# Patient Record
Sex: MALE | Race: OTHER | URBAN - METROPOLITAN AREA
[De-identification: names, ages, dates, MRNs, and addresses within clinical notes are randomized per-mention and may not be internally consistent; named-entity substitution may affect disease eponyms.]

---

## 2024-02-12 ENCOUNTER — OFFICE VISIT (OUTPATIENT)
Dept: FAMILY MEDICINE CLINIC | Facility: CLINIC | Age: 39
End: 2024-02-12

## 2024-02-12 VITALS
BODY MASS INDEX: 38.92 KG/M2 | SYSTOLIC BLOOD PRESSURE: 132 MMHG | DIASTOLIC BLOOD PRESSURE: 86 MMHG | WEIGHT: 248 LBS | HEART RATE: 89 BPM | HEIGHT: 67 IN

## 2024-02-12 DIAGNOSIS — Z00.00 PHYSICAL EXAM: Primary | ICD-10-CM

## 2024-02-12 PROCEDURE — 3008F BODY MASS INDEX DOCD: CPT | Performed by: FAMILY MEDICINE

## 2024-02-12 PROCEDURE — 3075F SYST BP GE 130 - 139MM HG: CPT | Performed by: FAMILY MEDICINE

## 2024-02-12 PROCEDURE — 99385 PREV VISIT NEW AGE 18-39: CPT | Performed by: FAMILY MEDICINE

## 2024-02-12 PROCEDURE — 3079F DIAST BP 80-89 MM HG: CPT | Performed by: FAMILY MEDICINE

## 2024-02-12 NOTE — PROGRESS NOTES
2/12/2024  2:22 PM    Patricio Buck Jr. is a 38 year old male.    Chief complaint(s):   Chief Complaint   Patient presents with    Physical     Allergy test     Dizziness     Episodes of dizziness X1 week      HPI:     Patricio Buck Jr. primary complaint is regarding CPE.     Patricio Buck Jr. is a 38 year old male is here for routine periodic health screening and examination.  His last physical exam was 8 years ago.  His last ECG was none . His last diabetes screening test was none .   His last cholesterol test was none.  Last dentist visit was 12months ago.  He is current with his Td immunization.  He is not current with his Flu vaccination and is not interested in receiving it today.  Patient last colonoscopy was none. He is not a smoker.        HISTORY:  No past medical history on file.   No past surgical history on file.   Family History   Problem Relation Age of Onset    Lipids Mother     Hypertension Mother     Hypertension Sister       Social History:   Social History     Socioeconomic History    Marital status: Single   Tobacco Use    Smoking status: Never   Substance and Sexual Activity    Alcohol use: Yes     Comment: occ    Drug use: No        Immunizations:     There is no immunization history on file for this patient.    Medications (Active prior to today's visit):  No current outpatient medications on file.       Allergies:  No Known Allergies      ROS:   Review of Systems   Constitutional:  Negative for appetite change, fatigue and fever.   HENT:  Negative for hearing loss and nosebleeds.    Eyes:  Negative for pain and visual disturbance.   Respiratory:  Negative for apnea and shortness of breath.    Cardiovascular:  Negative for chest pain, palpitations and leg swelling.   Gastrointestinal:  Negative for abdominal pain, blood in stool, constipation, diarrhea, nausea and vomiting.   Endocrine: Negative for polydipsia and polyuria.   Genitourinary:  Negative for decreased urine volume, frequency  and hematuria.        No nocturia   Musculoskeletal:  Negative for arthralgias.   Skin:  Negative for rash.   Neurological:  Positive for dizziness. Negative for syncope and headaches.   Psychiatric/Behavioral:  Negative for dysphoric mood and sleep disturbance.        PHYSICAL EXAM:   VS: /86   Pulse 89   Ht 5' 7\" (1.702 m)   Wt 248 lb (112.5 kg)   BMI 38.84 kg/m²     Physical Exam  Vitals reviewed.   Constitutional:       Appearance: Normal appearance.      Comments:      HENT:      Head: Normocephalic.      Right Ear: Hearing, tympanic membrane and ear canal normal.      Left Ear: Hearing, tympanic membrane and ear canal normal.      Nose: Nose normal. No rhinorrhea.      Mouth/Throat:      Mouth: Mucous membranes are moist.      Pharynx: Oropharynx is clear.   Eyes:      Extraocular Movements: Extraocular movements intact.      Conjunctiva/sclera: Conjunctivae normal.      Pupils: Pupils are equal, round, and reactive to light.   Neck:      Thyroid: No thyroid mass.      Vascular: No carotid bruit.   Cardiovascular:      Rate and Rhythm: Normal rate and regular rhythm.      Heart sounds: Normal heart sounds, S1 normal and S2 normal. No murmur heard.  Pulmonary:      Effort: Pulmonary effort is normal.      Breath sounds: Normal breath sounds.   Abdominal:      General: Abdomen is flat. Bowel sounds are normal.      Palpations: Abdomen is soft. There is no hepatomegaly, splenomegaly or mass.      Tenderness: There is no abdominal tenderness.      Hernia: No hernia is present.   Musculoskeletal:      Cervical back: Neck supple.      Comments: Spinal exam without scoliosis.      Lymphadenopathy:      Cervical: No cervical adenopathy.   Skin:     General: Skin is warm.      Findings: No rash.   Neurological:      General: No focal deficit present.      Mental Status: He is alert.      Deep Tendon Reflexes:      Reflex Scores:       Patellar reflexes are 2+ on the right side and 2+ on the left  side.  Psychiatric:         Attention and Perception: Attention normal.         Mood and Affect: Mood and affect normal.         LABORATORY RESULTS:     EKG / Spirometry : -     Radiology: No results found.     ASSESSMENT/PLAN:   Assessment   Encounter Diagnosis   Name Primary?    Physical exam Yes         CPE PLAN:    LABS / TEST & ORDERS for today's visit :Blood test(s) ordered today for send out to [facility] lab:  Orders Placed This Encounter   Procedures    CBC With Differential With Platelet    Comp Metabolic Panel (14)    Hemoglobin A1C    Lipid Panel    TSH W Reflex To Free T4    Vitamin D    Urinalysis with Culture Reflex      Referrals: EKG 12-LEAD  In-House; Urine dip.  Test/Procedures done today include: EKG.    IMMUNIZATIONS: none given today.    RECOMMENDATIONS given include: ANTICIPATORY GUIDANCE  topics covered today include: safety (i.e. seat belts, helmets, sunscreen, protective sports gear ), nutrition (i.e. healthy meals and snacks (i.e. avoid junk food and high-carbohydrate foods); athletic conditioning, fluids; low fat milk, limit to less than 20 oz. a day; dental care ), and Healthy habits& Social competence & Responsibilities: Recommendations on physical activity; exercise daily or at least 3 times a week for 30-60 minutes doing cardiovascular exercise. Encourage to maintain the best physical and dental hygiene possible.  Consider a  if overweight and/or having difficult in staying active; attempt to keep a schedule that includes an adequate sleep and physical exercise / activities Patient educated on doing regular self testicular exam. Patient was educated on sexual transmitted disease. Best to abstain from sexual intercourse until he is ready to form a family. Use of condoms may prevent transmission of infections as well as pregnancy.    FOLLOW-UP: Schedule a follow-up visit in 12 months.          Orders This Visit:  Orders Placed This Encounter   Procedures    CBC  With Differential With Platelet    Comp Metabolic Panel (14)    Hemoglobin A1C    Lipid Panel    TSH W Reflex To Free T4    Vitamin D    Urinalysis with Culture Reflex       Meds This Visit:  Requested Prescriptions      No prescriptions requested or ordered in this encounter       Imaging & Referrals:  EKG 12-LEAD         KYLEE ALBERT MD

## 2024-06-24 ENCOUNTER — OFFICE VISIT (OUTPATIENT)
Dept: INTERNAL MEDICINE CLINIC | Facility: CLINIC | Age: 39
End: 2024-06-24

## 2024-06-24 VITALS
DIASTOLIC BLOOD PRESSURE: 84 MMHG | SYSTOLIC BLOOD PRESSURE: 136 MMHG | BODY MASS INDEX: 39.08 KG/M2 | WEIGHT: 249 LBS | HEIGHT: 67 IN | HEART RATE: 74 BPM | TEMPERATURE: 98 F | OXYGEN SATURATION: 98 %

## 2024-06-24 DIAGNOSIS — R07.9 CHEST PAIN, UNSPECIFIED TYPE: ICD-10-CM

## 2024-06-24 DIAGNOSIS — R21 RASH: ICD-10-CM

## 2024-06-24 DIAGNOSIS — Z00.00 HEALTH MAINTENANCE EXAMINATION: Primary | ICD-10-CM

## 2024-06-24 DIAGNOSIS — E66.09 CLASS 2 OBESITY DUE TO EXCESS CALORIES WITHOUT SERIOUS COMORBIDITY WITH BODY MASS INDEX (BMI) OF 39.0 TO 39.9 IN ADULT: ICD-10-CM

## 2024-06-24 PROBLEM — E66.812 CLASS 2 OBESITY DUE TO EXCESS CALORIES WITHOUT SERIOUS COMORBIDITY WITH BODY MASS INDEX (BMI) OF 39.0 TO 39.9 IN ADULT: Status: ACTIVE | Noted: 2024-06-24

## 2024-06-24 NOTE — ASSESSMENT & PLAN NOTE
Patient with a history of pink rash seen in photos  No rash at this time  Consider urticaria.  Unclear if any triggers.   Was taking vitamin D3 -clear if related any weight  He also does have a history of persistent sun exposure-also unclear if related  Can see dermatology for further evaluation

## 2024-06-24 NOTE — PROGRESS NOTES
FAMILY MEDICINE CLINIC NOTE    HPI  Patricio Buck Jr. is a 39 year old male presenting to establish [facility]    #Health Maintenance  -Diet: Has been eating healthier  -Exercise: Cardio - boxing and weight lifting. 3 days a week  -Lung cancer screen: Not indicated  -Colon cancer screen: Not indicated  -Prostate cancer screen: Not indicated  -Aortic aneurysm screen: Not indicated  -Statin:  Will check lipid panel  -ASA: Not indicated  -HIV screen: Indicated  -Hep C screen: Indicated  -Gonorrhea/chlamydia:  Not indicated  -Syphillis: Not indicated  -TB: Not indicated  -Tobacco/alcohol: Per below  -Depression: PHQ-2 score of 0 (score >/= 3 do PHQ-9)  -Advanced Directive: Indicated    #Immunizations  -Tdap: Indicated  -Flu shot: Not indicated  -PCV13: Not indicated   -PCV20: Not indicated   -PPSV23: Not indicated   -HPV: Not indicated  -RZV (preferred) or VZL: Not indicated   -RSV: Not indicated   -COVID: Indicated      #Rash  -history of rash by arms and hands  -resolved currently  -was taking D3 - unsure if related  -was in the sun all day   -pink rash-not itchy   -has been on steroids in the past  -stopped D3 and now resolved   -has photos on phone    #chest wall  -left sided  -occurred 2-3 days ago  -no pain currently  -wasn't sure if he lifted something too heavy   -mild uncomfortable feeling   -some of the pain is positional - when he lies a certain way can make pain worse  -pain lasts for a couple seconds   -no SOB    #Patient Care Team  Patient Care Team:  Kacy Mayers MD as PCP - General    ROS  GENERAL: No fever/chills, no recent weight loss   HEENT: No visual changes, no changes in hearing, no sore throats  NECK: No pain, no swelling  RESP: No cough, no SOB  CV: No chest pain, no palpitations  GI: No abd pain, no N/V/D  MSK: No edema, + pain  SKIN: No new rashes  NEURO: No numbness, no tingling, no HA    HEALTH MAINTENANCE CHECKLIST  Health Maintenance Topics with due status: Overdue       Topic Date Due     DTaP,Tdap,and Td Vaccines Never done    COVID-19 Vaccine Never done       ALLERGIES  No Known Allergies    MEDICATIONS  No current outpatient medications on file.       ACTIVE PROBLEM  Patient Active Problem List   Diagnosis    Health maintenance examination    Class 2 obesity due to excess calories without serious comorbidity with body mass index (BMI) of 39.0 to 39.9 in adult    Rash    Chest pain       PAST MEDICAL HISTORY  History reviewed. No pertinent past medical history.    PAST SOCIAL HISTORY  Social History     Socioeconomic History    Marital status: Single     Spouse name: Not on file    Number of children: Not on file    Years of education: Not on file    Highest education level: Not on file   Occupational History    Not on file   Tobacco Use    Smoking status: Never    Smokeless tobacco: Never   Vaping Use    Vaping status: Never Used   Substance and Sexual Activity    Alcohol use: Yes     Comment: Occasional - once a week    Drug use: No    Sexual activity: Yes     Partners: Female   Other Topics Concern    Not on file   Social History Narrative    Relationships:  - Yesenia     Children: Jeni (3F). Michelle - Liam (19M), Mago (18F), Jadashawnrick (15F)    Pets: None    School: N/A    Work: [employer] - transferred to the [employer]     Origin: From [place]. Father was born here. Mother from [place].     Interests: Spending time with family. Spending time working.     Spiritual: Episcopalian - prays in the morning, goes to [employer].      Social Determinants of Health     Financial Resource Strain: Not on file   Food Insecurity: Not on file   Transportation Needs: Not on file   Physical Activity: Not on file   Stress: Not on file   Social Connections: Not on file   Housing Stability: Not on file       PAST SURGICAL HISTORY  History reviewed. No pertinent surgical history.    PAST FAMILY HISTORY  Family History   Problem Relation Age of Onset    Hyperlipidemia Mother     Hypertension Mother      No Known Problems Father         Not in contact    Hypertension Sister     Breast Cancer Maternal Grandmother     Cancer Maternal Grandfather         Brain    No Known Problems Paternal Grandmother     No Known Problems Paternal Grandfather     Colon Cancer Neg     Prostate Cancer Neg        PHYSICAL EXAM  Vitals:    06/24/24 1536   BP: 136/84   Pulse: 74   Temp: 98.4 °F (36.9 °C)   SpO2: 98%   Weight: 249 lb (112.9 kg)   Height: 5' 7\" (1.702 m)      Body mass index is 39 kg/m².    GENERAL: NAD  HEENT: Moist mucous membranes, no tonsillar swelling or erythema, PERRLA bilat, TM translucent and non-bulging  NECK: Supple, non-tender  RESP: CTAB, no wheezing, no rales, no ronchi  CV: RRR, no murmurs  GI: Soft, non-distended, non-tender, no guarding, no rebound, no masses  MSK: No edema.  No chest wall tenderness palpation.  SKIN: Warm and dry, no rashes seen on the hands at this time.  NEURO: Answering questions appropriately    LABS  No results found for: \"WBC\", \"HGB\", \"HCT\", \"PLT\", \"NEPERCENT\", \"LYPERCENT\", \"MOPERCENT\", \"EOPERCENT\", \"BAPERCENT\", \"NE\", \"LYMABS\", \"MOABSO\", \"EOABSO\", \"BAABSO\", \"REITCPERCENT\"    Lab Results   Component Value Date    GLU 90 09/22/2016         Lab Results   Component Value Date    CHOLEST 193 09/22/2016    TRIG 109 09/22/2016    HDL 39 09/22/2016     (H) 09/22/2016    CALCNONHDL 154 (H) 09/22/2016        DIAGNOSTICS    ASSESSMENT/PLAN  Problem List Items Addressed This Visit          Cardiac and Vasculature    Chest pain     Patient with a recent episode of mild left-sided chest discomfort, now currently resolved.  He works as [employer] and does regular heavy lifting-he is unsure if this was related to that.  He does note that the pain was short lasting and postural-sleeping on it made it worse.  Potentially musculoskeletal related-can take Advil as needed.  ER precautions discussed  Follow-up as needed.            Endocrine and Metabolic    Class 2 obesity due to excess calories  without serious comorbidity with body mass index (BMI) of 39.0 to 39.9 in adult     Healthy diet and exercise advised  Discussed options for weight loss-consider jumpstart program-deferred for now.  He is interested in seeing a bariatric specialist-referral provided  Monitor portion sizes  Start exercising more.         Relevant Orders    Comp Metabolic Panel (14)    Hemoglobin A1C    Lipid Panel    TSH W Reflex To Free T4    [facility] - Dr. Santos Chamberlain [facility] EMMG 9       Skin    Rash     Patient with a history of pink rash seen in photos  No rash at this time  Consider urticaria.  Unclear if any triggers.   Was taking vitamin D3 -clear if related any weight  He also does have a history of persistent sun exposure-also unclear if related  Can see dermatology for further evaluation         Relevant Orders    DERM - INTERNAL       Health Encounters    Health maintenance examination - Primary     Exercise and diet advised.  CBC, CMP, lipid panel, Hba1c, TSH, HIV screen, hepatitis C screen  Tdap today.  Advanced directive information provided.  Advised COVID vaccine.         Relevant Orders    CBC With Differential With Platelet    Comp Metabolic Panel (14)    HCV Antibody    Hemoglobin A1C    HIV Ag/Ab Combo    Lipid Panel    TSH W Reflex To Free T4    TETANUS, DIPHTHERIA TOXOIDS AND ACELLULAR PERTUSIS VACCINE (TDAP), >7 YEARS, IM USE (Completed)       Return in about 1 year (around 6/24/2025) for physical .    Len Borrego MD  Family Medicine

## 2024-06-24 NOTE — ASSESSMENT & PLAN NOTE
Healthy diet and exercise advised  Discussed options for weight loss-consider jumpstart program-deferred for now.  He is interested in seeing a bariatric specialist-referral provided  Monitor portion sizes  Start exercising more.

## 2024-06-24 NOTE — PATIENT INSTRUCTIONS
PATIENT INSTRUCTIONS    Thank you for seeing me today, it was a pleasure taking care of you.  Please check out at the  and schedule a follow up appointment.  Return in about 1 year (around 6/24/2025) for physical .  Please remember that the candi period for all appointments is 5 minutes. This is to help maximize the amount of time that we can spend together at our visits.    Please get your labs drawn - you may need to schedule a lab appointment if this was not completed at your recent doctor's visit.  The following imaging studies were ordered: None  Please also follow up with the following specialists: Weight, dermatology   Please fill out the advance directive information (power of  documents) and bring a copy to our clinic.  Portion control - monitor how much you are eating and try to cut back.  Do what you can to make healthy choices (as an example, choose water over soda).  You can try apps such a MyFitnessPal or Weight Watchers to help you monitor your diet and exercise habits.  Exercise - increase your intensity level slowly if you do not exercise regularly. Ideally your goal should eventually be to be moving at a pace where it is hard to have a conversation with someone.  Bring in photos of rash to dermatologist  Monitor sun exposure - see if related   Sun screen  If severe chest pain, go to ER  If discomfort from lifting, advil as needed      Best,   Dr. Elmo ROSENSheridan LABS AND IMAGING INFORMATION    Here are some lab and imaging locations available to you. Please note that some of the times and availabilities are subject to change. Please refer to the  [email or web address] webpage for the most recent updates. There are also additional locations that can be found on the  [email or web address] webpage.    To schedule a lab appointment, please call [phone].    To schedule an imaging appointment, please call [phone], option 2      [facility] [place]  [address]  [place] [address]    Lab Hours  Monday - Friday 7:30am - 5pm  Saturday 7:30am - 4pm    X-ray Hours  Monday - Friday 8am - 8pm  Sat, Sun & holidays 8am - 4:30pm      [facility]  [address] [place] [address]    Lab Hours  Monday - Friday 6:30am - 8pm  Saturday 6:30am - 3pm  Sunday 7:30am - 4pm    X-ray Hours  Monday - Friday 7am - 8pm  Saturday 7am - 3:30pm      [facility]  [address] [place] [address]    Lab Hours  Monday - Friday 7:30am - 5pm    X-ray Hours  None at this location      [facility] [address] [address]    Lab Hours  Lab services temporarily unavailable    X-ray Hours  Monday - Friday 8am - 4:30pm      [facility] [place]  [address] [place] [address]    Lab Hours  Monday - Friday 7:30am - 5pm  Saturday 7:30am - 4pm    X-ray Hours  Monday - Friday 8am - 8pm  Sat, Sun & holidays 8am - 4pm      [facility] [place]  [address]  [place] [address]    Lab Hours  Monday - Friday 7:30am - 4pm    X-ray Hours  Monday - Friday 8am - 4:30pm      [facility] [place]  [address]  [address]  [place] [address]    Lab Hours  Monday - Friday 8am - 4:30pm    X-ray Hours  None at this location

## 2024-06-24 NOTE — ASSESSMENT & PLAN NOTE
Patient with a recent episode of mild left-sided chest discomfort, now currently resolved.  He works as [employer] and does regular heavy lifting-he is unsure if this was related to that.  He does note that the pain was short lasting and postural-sleeping on it made it worse.  Potentially musculoskeletal related-can take Advil as needed.  ER precautions discussed  Follow-up as needed.

## 2024-06-25 ENCOUNTER — LAB ENCOUNTER (OUTPATIENT)
Dept: LAB | Age: 39
End: 2024-06-25
Attending: FAMILY MEDICINE

## 2024-06-25 DIAGNOSIS — Z00.00 HEALTH MAINTENANCE EXAMINATION: ICD-10-CM

## 2024-06-25 DIAGNOSIS — E66.09 CLASS 2 OBESITY DUE TO EXCESS CALORIES WITHOUT SERIOUS COMORBIDITY WITH BODY MASS INDEX (BMI) OF 39.0 TO 39.9 IN ADULT: ICD-10-CM

## 2024-06-25 LAB
ALBUMIN SERPL-MCNC: 4.3 G/DL (ref 3.2–4.8)
ALBUMIN/GLOB SERPL: 1.5 {RATIO} (ref 1–2)
ALP LIVER SERPL-CCNC: 93 U/L
ALT SERPL-CCNC: 40 U/L
ANION GAP SERPL CALC-SCNC: 8 MMOL/L (ref 0–18)
AST SERPL-CCNC: 26 U/L (ref ?–34)
BASOPHILS # BLD AUTO: 0.02 X10(3) UL (ref 0–0.2)
BASOPHILS NFR BLD AUTO: 0.2 %
BILIRUB SERPL-MCNC: 0.6 MG/DL (ref 0.3–1.2)
BUN BLD-MCNC: 12 MG/DL (ref 9–23)
BUN/CREAT SERPL: 14 (ref 10–20)
CALCIUM BLD-MCNC: 9.3 MG/DL (ref 8.7–10.4)
CHLORIDE SERPL-SCNC: 107 MMOL/L (ref 98–112)
CHOLEST SERPL-MCNC: 184 MG/DL (ref ?–200)
CO2 SERPL-SCNC: 22 MMOL/L (ref 21–32)
CREAT BLD-MCNC: 0.86 MG/DL
DEPRECATED RDW RBC AUTO: 41.5 FL (ref 35.1–46.3)
EGFRCR SERPLBLD CKD-EPI 2021: 113 ML/MIN/1.73M2 (ref 60–?)
EOSINOPHIL # BLD AUTO: 0.24 X10(3) UL (ref 0–0.7)
EOSINOPHIL NFR BLD AUTO: 2.7 %
ERYTHROCYTE [DISTWIDTH] IN BLOOD BY AUTOMATED COUNT: 12.6 % (ref 11–15)
EST. AVERAGE GLUCOSE BLD GHB EST-MCNC: 111 MG/DL (ref 68–126)
FASTING PATIENT LIPID ANSWER: YES
FASTING STATUS PATIENT QL REPORTED: YES
GLOBULIN PLAS-MCNC: 2.9 G/DL (ref 2–3.5)
GLUCOSE BLD-MCNC: 98 MG/DL (ref 70–99)
HBA1C MFR BLD: 5.5 % (ref ?–5.7)
HCT VFR BLD AUTO: 45.3 %
HCV AB SERPL QL IA: NONREACTIVE
HDLC SERPL-MCNC: 41 MG/DL (ref 40–59)
HGB BLD-MCNC: 16.1 G/DL
IMM GRANULOCYTES # BLD AUTO: 0.03 X10(3) UL (ref 0–1)
IMM GRANULOCYTES NFR BLD: 0.3 %
LDLC SERPL CALC-MCNC: 122 MG/DL (ref ?–100)
LYMPHOCYTES # BLD AUTO: 2.77 X10(3) UL (ref 1–4)
LYMPHOCYTES NFR BLD AUTO: 30.7 %
MCH RBC QN AUTO: 31.5 PG (ref 26–34)
MCHC RBC AUTO-ENTMCNC: 35.5 G/DL (ref 31–37)
MCV RBC AUTO: 88.6 FL
MONOCYTES # BLD AUTO: 0.68 X10(3) UL (ref 0.1–1)
MONOCYTES NFR BLD AUTO: 7.5 %
NEUTROPHILS # BLD AUTO: 5.28 X10 (3) UL (ref 1.5–7.7)
NEUTROPHILS # BLD AUTO: 5.28 X10(3) UL (ref 1.5–7.7)
NEUTROPHILS NFR BLD AUTO: 58.6 %
NONHDLC SERPL-MCNC: 143 MG/DL (ref ?–130)
OSMOLALITY SERPL CALC.SUM OF ELEC: 284 MOSM/KG (ref 275–295)
PLATELET # BLD AUTO: 259 10(3)UL (ref 150–450)
POTASSIUM SERPL-SCNC: 4.1 MMOL/L (ref 3.5–5.1)
PROT SERPL-MCNC: 7.2 G/DL (ref 5.7–8.2)
RBC # BLD AUTO: 5.11 X10(6)UL
SODIUM SERPL-SCNC: 137 MMOL/L (ref 136–145)
TRIGL SERPL-MCNC: 114 MG/DL (ref 30–149)
TSI SER-ACNC: 0.91 MIU/ML (ref 0.55–4.78)
VLDLC SERPL CALC-MCNC: 20 MG/DL (ref 0–30)
WBC # BLD AUTO: 9 X10(3) UL (ref 4–11)

## 2024-06-25 PROCEDURE — 87389 HIV-1 AG W/HIV-1&-2 AB AG IA: CPT | Performed by: FAMILY MEDICINE

## 2024-06-25 PROCEDURE — 86803 HEPATITIS C AB TEST: CPT | Performed by: FAMILY MEDICINE

## 2024-06-25 PROCEDURE — 83036 HEMOGLOBIN GLYCOSYLATED A1C: CPT | Performed by: FAMILY MEDICINE

## 2024-06-25 PROCEDURE — 80050 GENERAL HEALTH PANEL: CPT | Performed by: FAMILY MEDICINE

## 2024-06-25 PROCEDURE — 80061 LIPID PANEL: CPT | Performed by: FAMILY MEDICINE

## 2024-07-05 ENCOUNTER — HOSPITAL ENCOUNTER (OUTPATIENT)
Age: 39
Discharge: HOME | End: 2024-07-05
Payer: COMMERCIAL

## 2024-07-05 ENCOUNTER — APPOINTMENT (OUTPATIENT)
Dept: GENERAL RADIOLOGY | Age: 39
End: 2024-07-05
Attending: NURSE PRACTITIONER
Payer: COMMERCIAL

## 2024-07-05 VITALS
DIASTOLIC BLOOD PRESSURE: 80 MMHG | TEMPERATURE: 98 F | SYSTOLIC BLOOD PRESSURE: 145 MMHG | OXYGEN SATURATION: 99 % | RESPIRATION RATE: 20 BRPM | HEART RATE: 82 BPM

## 2024-07-05 DIAGNOSIS — R07.9 CHEST PAIN, UNSPECIFIED TYPE: ICD-10-CM

## 2024-07-05 DIAGNOSIS — R07.89 ACUTE CHEST WALL PAIN: Primary | ICD-10-CM

## 2024-07-05 LAB
#MXD IC: 0.9 X10ˆ3/UL (ref 0.1–1)
BUN BLD-MCNC: 12 MG/DL (ref 7–18)
CHLORIDE BLD-SCNC: 102 MMOL/L (ref 98–112)
CO2 BLD-SCNC: 24 MMOL/L (ref 21–32)
CREAT BLD-MCNC: 1.2 MG/DL
EGFRCR SERPLBLD CKD-EPI 2021: 79 ML/MIN/1.73M2 (ref 60–?)
GLUCOSE BLD-MCNC: 96 MG/DL (ref 70–99)
HCT VFR BLD AUTO: 45.1 %
HCT VFR BLD CALC: 49 %
HGB BLD-MCNC: 15 G/DL
ISTAT IONIZED CALCIUM FOR CHEM 8: 1.23 MMOL/L (ref 1.12–1.32)
LYMPHOCYTES # BLD AUTO: 2.4 X10ˆ3/UL (ref 1–4)
LYMPHOCYTES NFR BLD AUTO: 23.7 %
MCH RBC QN AUTO: 30.2 PG (ref 26–34)
MCHC RBC AUTO-ENTMCNC: 33.3 G/DL (ref 31–37)
MCV RBC AUTO: 90.7 FL (ref 80–100)
MIXED CELL %: 8.7 %
NEUTROPHILS # BLD AUTO: 6.8 X10ˆ3/UL (ref 1.5–7.7)
NEUTROPHILS NFR BLD AUTO: 67.6 %
PLATELET # BLD AUTO: 299 X10ˆ3/UL (ref 150–450)
POTASSIUM BLD-SCNC: 4 MMOL/L (ref 3.6–5.1)
RBC # BLD AUTO: 4.97 X10ˆ6/UL
SODIUM BLD-SCNC: 139 MMOL/L (ref 136–145)
TROPONIN I BLD-MCNC: <0.02 NG/ML
WBC # BLD AUTO: 10.1 X10ˆ3/UL (ref 4–11)

## 2024-07-05 PROCEDURE — 71046 X-RAY EXAM CHEST 2 VIEWS: CPT | Performed by: NURSE PRACTITIONER

## 2024-07-05 PROCEDURE — 93000 ELECTROCARDIOGRAM COMPLETE: CPT | Performed by: NURSE PRACTITIONER

## 2024-07-05 PROCEDURE — 84484 ASSAY OF TROPONIN QUANT: CPT | Performed by: NURSE PRACTITIONER

## 2024-07-05 PROCEDURE — 99204 OFFICE O/P NEW MOD 45 MIN: CPT | Performed by: NURSE PRACTITIONER

## 2024-07-05 PROCEDURE — 80047 BASIC METABLC PNL IONIZED CA: CPT | Performed by: NURSE PRACTITIONER

## 2024-07-05 PROCEDURE — 85025 COMPLETE CBC W/AUTO DIFF WBC: CPT | Performed by: NURSE PRACTITIONER

## 2024-07-05 RX ORDER — NAPROXEN 500 MG/1
500 TABLET ORAL 2 TIMES DAILY PRN
Qty: 20 TABLET | Refills: 0 | Status: SHIPPED | OUTPATIENT
Start: 2024-07-05 | End: 2024-07-12

## 2024-07-05 NOTE — DISCHARGE INSTRUCTIONS
As we discussed your EKG, blood tests, and chest x-ray are all within normal limits.  It is important that you follow-up with your primary care provider to discuss the high cholesterol, significant family history of heart disease, and this visit to immediate care.  Further evaluation of your cardiac status may be indicated.  The chest pain does now appear to be chest wall or musculoskeletal.  Take the naproxen as prescribed twice a day with food to decrease inflammation, for pain May take Tylenol 1000 mg every 8 hours.  Avoid lifting excessive weights, activity as tolerated.  Apply cold packs or warm packs for comfort.  May apply 4% lidocaine topical pain medication to the area.  If any shortness of breath, chest pain, lightheadedness, or palpitations return here or go directly to the ER.  Thank you for choosing our [facility] for your medical condition today. Please be sure to follow up with your primary care provider in 2 days if no improvement, or as directed. If any worsening of your symptoms or other concerns call your primary care provider, return here or go to the emergency department.

## 2024-07-05 NOTE — ED PROVIDER NOTES
No chief complaint on file.      HPI:     Patricio Buck Jr. is a 39 year old male who presents with the chief complaint of chest pain.  Patient describes a soreness or an ache under his left breast that radiates towards the left lateral chest.  Patient states this pain is associated with an ache in his left arm also.  Patient denies any recent injury, new exercise regimen, associated cough or upper respiratory symptoms.  Patient denies palpitations or shortness of breath.  He does admit to an episode of lightheadedness yesterday and he has noted increasing fatigue.  Patient states the episodes of chest pain began lasting only minutes and he states now he believes episodes have lasted greater than 1 hour.  Patient is employed at [employer] and does heavy lifting however he states that he cannot identify any recent heavy lifting with the onset of his chest discomfort.  Patient was seen by his primary care provider 10 days ago for a rash on his bilateral hands after starting vitamin D.  The patient was referred to an allergist.  The rash was gone since he discontinued the vitamin D and he has not seen the allergist.  Patient denies any other rash or areas of erythema at this time.  Patient does have a history of early cardiac disease in his family.  States his father had 2 or 3 acute heart attacks and a triple bypass surgery when he was in his 40s.  His mother is alive with hypertension.  Patient had recent lab work done by his primary care provider on June 25 which reveals hyperlipidemia.  Patient denies other known cardiac disease.      PFSH    PFS asessment screens reviewed and agree.  Nurses notes reviewed I agree with documentation.    Family History   Problem Relation Age of Onset    Hyperlipidemia Mother     Hypertension Mother     No Known Problems Father         Not in contact    Hypertension Sister     Breast Cancer Maternal Grandmother     Cancer Maternal Grandfather         Brain    No Known Problems  Paternal Grandmother     No Known Problems Paternal Grandfather     Colon Cancer Neg     Prostate Cancer Neg      Family history is reviewed and is as noted in HPI.  Social History     Socioeconomic History    Marital status: Single     Spouse name: Not on file    Number of children: Not on file    Years of education: Not on file    Highest education level: Not on file   Occupational History    Not on file   Tobacco Use    Smoking status: Never    Smokeless tobacco: Never   Vaping Use    Vaping status: Never Used   Substance and Sexual Activity    Alcohol use: Yes     Comment: Occasional - once a week    Drug use: No    Sexual activity: Yes     Partners: Female   Other Topics Concern    Not on file   Social History Narrative    Relationships:  - Yesenia     Children: Jeni (3F). Michelle Wheeler (19M), Mago (18F), Asuncion (15F)    Pets: None    School: N/A    Work: [employer] - transferred to the [employer] [facility] [employer]     Origin: From [facility]. Father was born here. Mother from [place].     Interests: Spending time with family. Spending time working.     Spiritual: Spiritism - prays in the morning, goes to Denominational.      Social Determinants of Health     Financial Resource Strain: Not on file   Food Insecurity: Not on file   Transportation Needs: Not on file   Physical Activity: Not on file   Stress: Not on file   Social Connections: Not on file   Housing Stability: Not on file         ROS:   Positive for stated complaint: Chest pain  All other systems reviewed and negative except as noted above.  Constitutional and Vital Signs Reviewed.      Physical Exam:     Findings:    /80   Pulse 95   Temp 98 °F (36.7 °C) (Temporal)   Resp 20   SpO2 98%   GENERAL: well developed, obese, well hydrated, no distress  SKIN: good skin turgor, no obvious rashes  NECK: supple, no adenopathy, no carotid bruit  CARDIO: RRR without murmur, Cap refill brisk, bilateral distal radial pulses 2+ and symmetric  CHEST: Chest  wall minimally tender with deep compression and deep palpation left anterior chest.  Unable to reproduce the pain patient was having.  No areas of ecchymosis or erythema noted no deformity.  No step defects.  EXTREMITIES: CASTILLO without difficulty, no pain with palpation of right arm or range of motion of right shoulder right elbow or right wrist.  GI: soft, non-tender to palpation  HEAD: normocephalic, atraumatic, no facial asymmetry  EYES: bilateral sclera non icteric, no conjunctival injection or discharge, no periorbital swelling  THROAT: airway patent,   LUNGS: Full symmetric AE, clear to auscultation bilaterally; no rales, rhonchi, or wheezes, No signs of increased WOB  NEURO: No observed focal deficits, speech clear  PSYCH: Alert and oriented x3.  Answering questions appropriately.  Mood appropriate.    MDM/Assessment/Plan:   Orders for this encounter:    Orders Placed This Encounter    XR CHEST PA + LAT CHEST (AHZ=08508)     Discomfort Pt states having left side rib pain that has radiated to his arm at time.   Pt states does left a lot of things. Pt states having the symptoms for   about 10 days. Pt states describes pain as discomfort and has not been   sharp. Pt denies SOB.        Order Specific Question:   What is the Relevant Clinical Indication / Reason for Exam?     Answer:   Discomfort     Order Specific Question:   Release to patient     Answer:   Immediate    POCT CBC     Order Specific Question:   Release to patient     Answer:   Immediate    EKG 12 Lead     Order Specific Question:   Release to patient     Answer:   Immediate    POCT ISTAT chem8 cartridge    ISTAT Troponin    iStat (Chem 8)    iStat (Troponin)    naproxen 500 MG Oral Tab     Sig: Take 1 tablet (500 mg total) by mouth 2 (two) times daily as needed.     Dispense:  20 tablet     Refill:  0       Labs performed this visit:  Recent Results (from the past 10 hour(s))   POCT CBC    Collection Time: 07/05/24  6:14 PM   Result Value Ref Range     WBC IC 10.1 4.0 - 11.0 x10ˆ3/uL    RBC IC 4.97 4.30 - 5.70 X10ˆ6/uL    HGB IC 15.0 13.0 - 17.5 g/dL    HCT IC 45.1 39.0 - 53.0 %    MCV IC 90.7 80.0 - 100.0 fL    MCH IC 30.2 26.0 - 34.0 pg    MCHC IC 33.3 31.0 - 37.0 g/dL    PLT .0 150.0 - 450.0 X10ˆ3/uL    # Neutrophil 6.8 1.5 - 7.7 X10ˆ3/uL    # Lymphocyte 2.4 1.0 - 4.0 X10ˆ3/uL    # Mixed Cells 0.9 0.1 - 1.0 X10ˆ3/uL    Neutrophil % 67.6 %    Lymphocyte % 23.7 %    Mixed Cell % 8.7 %   POCT ISTAT chem8 cartridge    Collection Time: 07/05/24  6:19 PM   Result Value Ref Range    ISTAT Sodium 139 136 - 145 mmol/L    ISTAT BUN 12 7 - 18 mg/dL    ISTAT Potassium 4.0 3.6 - 5.1 mmol/L    ISTAT Chloride 102 98 - 112 mmol/L    ISTAT Ionized Calcium 1.23 1.12 - 1.32 mmol/L    ISTAT Hematocrit 49 37 - 53 %    ISTAT Glucose 96 70 - 99 mg/dL    ISTAT TCO2 24 21 - 32 mmol/L    ISTAT Creatinine 1.20 0.70 - 1.30 mg/dL    eGFR-Cr 79 >=60 mL/min/1.73m2   EKG 12 Lead    Collection Time: 07/05/24  6:20 PM   Result Value Ref Range    Ventricular rate 71 BPM    Atrial rate 71 BPM    P-R Interval 162 ms    QRS Duration 90 ms    Q-T Interval 362 ms    QTC Calculation (Bezet) 393 ms    P Axis 43 degrees    R Axis 65 degrees    T Axis 59 degrees   ISTAT Troponin    Collection Time: 07/05/24  6:21 PM   Result Value Ref Range    ISTAT Troponin <0.02 <0.045 ng/mL ng/mL     EKG    Rate, intervals and axes as noted on EKG Report.  Rate: Of anyone  Rhythm: Sinus Rhythm  Reading: Normal sinus rhythm without acute ST segment changes               MDM:  Differential diagnoses: Muscle strain, acute coronary syndrome, angina, costochondritis.  Cormorbidities adding complexity: Obesity, hyperlipidemia  Discussions of management done with: Dr. Mas  My independent interpretation of studies: EKG shows normal sinus rhythm without acute ST segment changes, troponin is within normal limits, CBC and BMP are also within normal limits.  Chest x-ray without any acute disease process, confirmed by  radiologist.  Social determinants of health affecting care: Family history of early cardiac disease in father  Shared decision making done by: Dr. Mas myself and patient.  Patient advised labs, EKG, and chest x-ray are all within normal limits.  Strongly advised to schedule a follow-up appointment with his primary care provider to discuss the chest pain the hyperlipidemia and the significant cardiac history in his father.  Patient advised to call on Monday and schedule an appointment for next week.  Patient advised this appears to be a muscle strain or chest wall strain.  Naproxen has been prescribed as an anti-inflammatory, and we discussed management at home with topical analgesia and Tylenol and warm or cold applications.  He was advised activity as tolerated.  Also advised if he develops any shortness of breath, severe chest pain, difficulty breathing, lightheadedness or palpitations to return here or go directly to the ER.  Patient agrees with this plan of care.     Counseled: Patient, regarding diagnosis, regarding treatment plan, regarding diagnostic results, regarding prescription, I have discussed with the patient the results of tests, differential diagnosis, and warning signs and symptoms that should prompt immediate return. The patient understands these instructions and agrees to the follow-up plan provided. There is no barriers to learning. Appropriate f/u given. Emergency precautions given. Patient agrees to return for any concerns/ problems/complications.          Diagnosis:    ICD-10-CM    1. Acute chest wall pain  R07.89       2. Chest pain, unspecified type  R07.9 XR CHEST PA + LAT CHEST (CPT=71046)     XR CHEST PA + LAT CHEST (CPT=71046)          All results reviewed and discussed with patient.  See AVS for detailed discharge instructions for your condition today.    Follow Up with:  Len Borrego MD  [address] [place] [id number]  [phone]    In 3 days

## 2024-07-05 NOTE — ED INITIAL ASSESSMENT (HPI)
Pt states having left side rib pain that has radiated to his arm at time. Pt states does left a lot of things. Pt states having the symptoms for about 10 days. Pt states describes pain as discomfort and has not been sharp. Pt denies SOB.

## 2024-07-06 LAB
ATRIAL RATE: 71 BPM
P AXIS: 43 DEGREES
P-R INTERVAL: 162 MS
Q-T INTERVAL: 362 MS
QRS DURATION: 90 MS
QTC CALCULATION (BEZET): 393 MS
R AXIS: 65 DEGREES
T AXIS: 59 DEGREES
VENTRICULAR RATE: 71 BPM

## 2025-02-25 ENCOUNTER — OFFICE VISIT (OUTPATIENT)
Dept: SURGERY | Facility: CLINIC | Age: 40
End: 2025-02-25
Payer: COMMERCIAL

## 2025-02-25 VITALS
BODY MASS INDEX: 38.92 KG/M2 | DIASTOLIC BLOOD PRESSURE: 82 MMHG | WEIGHT: 248 LBS | SYSTOLIC BLOOD PRESSURE: 124 MMHG | RESPIRATION RATE: 16 BRPM | HEIGHT: 67 IN | HEART RATE: 82 BPM

## 2025-02-25 DIAGNOSIS — R06.83 SNORING: ICD-10-CM

## 2025-02-25 DIAGNOSIS — E78.5 DYSLIPIDEMIA: Primary | ICD-10-CM

## 2025-02-25 DIAGNOSIS — E66.9 OBESITY (BMI 30-39.9): ICD-10-CM

## 2025-02-25 PROCEDURE — 3008F BODY MASS INDEX DOCD: CPT | Performed by: INTERNAL MEDICINE

## 2025-02-25 PROCEDURE — 99205 OFFICE O/P NEW HI 60 MIN: CPT | Performed by: INTERNAL MEDICINE

## 2025-02-25 PROCEDURE — 3074F SYST BP LT 130 MM HG: CPT | Performed by: INTERNAL MEDICINE

## 2025-02-25 PROCEDURE — 3079F DIAST BP 80-89 MM HG: CPT | Performed by: INTERNAL MEDICINE

## 2025-02-25 RX ORDER — PHENTERMINE HYDROCHLORIDE 15 MG/1
15 CAPSULE ORAL EVERY MORNING
Qty: 30 CAPSULE | Refills: 5 | Status: SHIPPED | OUTPATIENT
Start: 2025-02-25

## 2025-02-25 NOTE — PROGRESS NOTES
[facility] Consultation Note       Patient:  Patricio Buck Jr.  :      1985  MRN:      [id number]    Referring Provider: Dr. Borrego       Chief Complaint:    Chief Complaint   Patient presents with    Consult       SUBJECTIVE     History of Present Illness:  Patricio Buck Jr. has been referred to me for evaluation and treatment.       40 yo who lives with family  He does the cooking  Currently at heaviest weight  [employer]    Patient has tried several diets in the past including exercises and is frustrated with increase of weight. Weight has been a struggle for the past several years and is now starting to develop into co-morbidities that are worrisome to the patient. Patient is interested in losing weight, so it can stay off long term.    Patient also understands that this is a life style change and wants to get on track.    Interested in non surgical weight loss    Past Medical History:   Past Medical History:    Obesity (BMI 30-39.9)       OBJECTIVE     Vitals: /82   Pulse 82   Resp 16   Ht 5' 7\" (1.702 m)   Wt 248 lb (112.5 kg)   BMI 38.84 kg/m²      Patient Medications:    Current Outpatient Medications   Medication Sig Dispense Refill    Phentermine HCl 15 MG Oral Cap Take 1 capsule (15 mg total) by mouth every morning. 30 capsule 5       Allergies:  Patient has no known allergies.     Comorbidities:  dyslipidemia     Social History:    Social History     Socioeconomic History    Marital status: Single     Spouse name: Not on file    Number of children: Not on file    Years of education: Not on file    Highest education level: Not on file   Occupational History    Not on file   Tobacco Use    Smoking status: Never    Smokeless tobacco: Never   Vaping Use    Vaping status: Never Used   Substance and Sexual Activity    Alcohol use: Yes     Comment: Occasional - once a week    Drug use: No    Sexual activity: Yes     Partners: Female   Other Topics Concern    Not on file    Social History Narrative    Relationships:  - Yesenia     Children: Jeni (3F). Michelle Wheeler (19M), Mago (18F), Asuncion (15F)    Pets: None    School: N/A    Work: [employer] - transferred to the [employer]     Origin: From [facility]. Father was born here. Mother from [place].     Interests: Spending time with family. Spending time working.     Spiritual: Oriental orthodox - prays in the morning, goes to Religion.      Social Drivers of Health     Food Insecurity: Not on file   Transportation Needs: Not on file   Stress: Not on file   Housing Stability: Not on file     Surgical History:  No past surgical history on file.    Family History:    Family History   Problem Relation Age of Onset    Hyperlipidemia Mother     Hypertension Mother     No Known Problems Father         Not in contact    Hypertension Sister     Breast Cancer Maternal Grandmother     Cancer Maternal Grandfather         Brain    No Known Problems Paternal Grandmother     No Known Problems Paternal Grandfather     Colon Cancer Neg     Prostate Cancer Neg            Typical Dietary Intake:  Breakfast AM Snack Lunch PM Snack Dinner   Water, energy drinks 0 sugar Energy drink,  FF  Sweet bread Pasta, chicken, rice, tortillas x5, veggies   Eats quickly  Sweet tooth  portions    Soda Drinker?: Yes  If yes, how much?:  regular    Exercise: 2x/day   Has membership at [employer]    Number of restaurant or fast food meals/week:  4 meals/week    Nutritional Goals Reviewed and Discussed:     Limit carbohydrates to 100 gms per day, Eat 100-200 calories within 1 hour of waking up, and Eat 3-4 cups of fresh fruit or vegetables daily    Behavior Modifications Reviewed and Discussed:    Eat breakfast, Eat 3 meals per day, Plan meals in advance, Read nutrition labels, Drink 64oz of water per day, Maintain a daily food journal, No drinking 30 minutes before or after meals, Utilize portion control strategies to reduce calorie intake, Identify triggers for  eating and manage cues, and Eat slowly and take 20 to 30 minutes to complete each meal      ROS:  Constitutional: positive for fatigue  Respiratory: negative  Cardiovascular: negative  Gastrointestinal: negative  Musculoskeletal:positive for back pain  Neurological: positive for headaches  Behavioral/Psych: positive for stress  Endocrine: negative  All other systems were reviewed and are negative.    Physical Exam:  General appearance: alert, appears stated age, cooperative, and moderately obese  Head: Normocephalic, without obvious abnormality, atraumatic  Back: symmetric, no curvature. ROM normal. No CVA tenderness.  Lungs: clear to auscultation bilaterally  Heart: S1, S2 normal, no murmur, click, rub or gallop, regular rate and rhythm  Abdomen:  firm, obese, non tender  Extremities: extremities normal, atraumatic, no cyanosis or edema  Pulses: 2+ and symmetric  Skin: Skin color, texture, turgor normal. No rashes or lesions  Neurologic: Grossly normal    ASSESSMENT     HYPERCHOLESTEROLEMIA:  The patient states that his cholesterol has been well controlled on his current diet    Lab Results   Component Value Date/Time    CHOLEST 184 06/25/2024 10:38 AM     (H) 06/25/2024 10:38 AM    HDL 41 06/25/2024 10:38 AM    TRIG 114 06/25/2024 10:38 AM    VLDL 20 06/25/2024 10:38 AM         Encounter Diagnosis(ses):   1. Dyslipidemia    2. Snoring    3. Obesity (BMI 30-39.9)        PLAN     Patient is not interested in bariatric surgery. Patient desires to pursue traditional weight loss at this time.      OBSTRUCTIVE SLEEP APNEA: Given the patient's history suggestive of obstructive sleep apnea as outlined above, consideration for obtaining a sleep study may be warranted.  Further consideration for obtaining the sleep study will be discussed with the patient's PCP.    DYSLIPIDEMIA: Stable on the above prescribed meal plan  Liver function stable.    Lab Results   Component Value Date/Time    CHOLEST 184 06/25/2024 10:38  AM     (H) 06/25/2024 10:38 AM    HDL 41 06/25/2024 10:38 AM    TRIG 114 06/25/2024 10:38 AM    VLDL 20 06/25/2024 10:38 AM       Goals for next month:  1. Keep a food log.  2. Drink 48-64 ounces of non-caloric beverages per day. No fruit juices or regular soda.  3. Increase activity-upper body exercises, walk 10 minutes per day.  4. Increase fruit and vegetable servings to 5-6 per day.      PHENTERMINE: Since the patient would like to try phentermine, and is aware of the potential side effects (hypertension, palpitations, tachycardia, and anxiety), I will give Patricio Buck Jr. a prescription today to be used in conjunction with the above diet and exercise program. The patient will check his heart rate and blood pressure on a regular basis. He will call me if his BP goes over 140/90 or if he has palpitations or racing heart rate. He understands that I will not call in the prescription for him; he has to have an appointment to have the medication refilled.     Will start at 15 mg  EKG done    Refer to pulmonary team    Cut out sodas    Diagnoses and all orders for this visit:    Dyslipidemia    Snoring  -     Pulmonary Referral - In Network    Obesity (BMI 30-39.9)  -     Phentermine HCl 15 MG Oral Cap; Take 1 capsule (15 mg total) by mouth every morning.        Santos Chamberlain MD

## 2025-03-24 NOTE — PROGRESS NOTES
FAMILY MEDICINE CLINIC NOTE    HPI  Patricio Buck Jr. is a 39 year old male presenting for     #Forearm pain  -was on a ladder, reaching to grab a box - in December  -fingers went backwards  -felt pain at back of fingers and wrist  -has been giving it some time   -no longer having pain in fingers and wrist  -now started having elbow/forearm pain - worsens with certain movements  -also history of right shoulder pain - since high school - improper lifting   -went to [facility] a month ago - was given a shot  -was prescribed steroid - medrol dose pack  -starting to work out now as well  -no numbness/tingling    #Obesity  -Dr Chamberlain  -phentermine    ----other     #Rash ---resolved  -history of rash by arms and hands  -resolved currently  -was taking D3 - unsure if related  -was in the sun all day   -pink rash-not itchy   -has been on steroids in the past  -stopped D3 and now resolved   -has photos on phone     #chest wall ----resolved  -left sided  -occurred 2-3 days ago  -no pain currently  -wasn't sure if he lifted something too heavy   -mild uncomfortable feeling   -some of the pain is positional - when he lies a certain way can make pain worse  -pain lasts for a couple seconds   -no SOB      ROS  GENERAL: No fever/chills, no recent weight loss  HEENT: No visual changes, no changes in hearing, no sore throats  NECK: No pain, no swelling  RESP: No cough, no SOB  CV: No chest pain, no palpitations  GI: No abd pain, no N/V/D  MSK: No edema  SKIN: No new rashes  NEURO: No numbness, no tingling, no headaches    HEALTH MAINTENANCE  Health Maintenance Topics with due status: Overdue       Topic Date Due    COVID-19 Vaccine 09/01/2024    Influenza Vaccine Never done    Annual Depression Screening 01/01/2025    Annual Physical 02/12/2025       ALLERGIES  Allergies[1]    MEDICATIONS  Current Outpatient Medications   Medication Sig Dispense Refill    Phentermine HCl 15 MG Oral Cap Take 1 capsule (15 mg total) by mouth  every morning. 30 capsule 5       ACTIVE PROBLEMS  Patient Active Problem List   Diagnosis    Health maintenance examination    Class 2 obesity due to excess calories without serious comorbidity with body mass index (BMI) of 37.0 to 37.9 in adult    Dyslipidemia    Snoring    Chronic right shoulder pain    Pain of right forearm       PAST MEDICAL HISTORY  Past Medical History:    Obesity (BMI 30-39.9)       PAST SOCIAL HISTORY  Social History     Socioeconomic History    Marital status: Single     Spouse name: Not on file    Number of children: Not on file    Years of education: Not on file    Highest education level: Not on file   Occupational History    Not on file   Tobacco Use    Smoking status: Never    Smokeless tobacco: Never   Vaping Use    Vaping status: Never Used   Substance and Sexual Activity    Alcohol use: Yes     Comment: Occasional - once a week    Drug use: No    Sexual activity: Yes     Partners: Female   Other Topics Concern    Not on file   Social History Narrative    Relationships:  - Yesenia     Children: Jeni (3F). Michelle Wheeler (19M), Mago (18F), Asucnion (15F)    Pets: None    School: N/A    Work: [employer] - transferred to the [employer]     Origin: From [facility]. Father was born here. Mother from [place].     Interests: Spending time with family. Spending time working.     Spiritual: Voodoo - prays in the morning, goes to [employer].      Social Drivers of Health     Food Insecurity: Not on file   Transportation Needs: Not on file   Stress: Not on file   Housing Stability: Not on file       PAST SURGICAL HISTORY  History reviewed. No pertinent surgical history.    PAST FAMILY HISTORY  Family History   Problem Relation Age of Onset    Hyperlipidemia Mother     Hypertension Mother     No Known Problems Father         Not in contact    Hypertension Sister     Breast Cancer Maternal Grandmother     Cancer Maternal Grandfather         Brain    No Known Problems Paternal  Grandmother     No Known Problems Paternal Grandfather     Colon Cancer Neg     Prostate Cancer Neg          PHYSICAL EXAM  Vitals:    03/25/25 0802   BP: 124/86   Pulse: 80   Temp: 98 °F (36.7 °C)   SpO2: 99%   Weight: 242 lb (109.8 kg)   Height: 5' 7\" (1.702 m)      Body mass index is 37.9 kg/m².    GENERAL: NAD  RESP: Non-labored respirations  MSK: No edema.  No tenderness throughout the entire right hand and wrist.  Mild tenderness to the lateral aspect of the right forearm.  No bony tenderness to palpation throughout the entire right arm.  No tenderness to the medial or lateral epicondyle of the right elbow.  Mild tenderness to the anterior and lateral aspect of the right shoulder.  Neer's negative to the right shoulder.  Mcnamara positive to the right shoulder.  Empty cans positive the right shoulder.  Yergason's negative.  SKIN: Warm and dry, no rashes  NEURO: Answering questions appropriately    LABS  Lab Results   Component Value Date    WBC 9.0 06/25/2024    HGB 16.1 06/25/2024    HCT 45.3 06/25/2024    .0 06/25/2024    NEPERCENT 67.6 07/05/2024    LYPERCENT 23.7 07/05/2024    MOPERCENT 7.5 06/25/2024    EOPERCENT 2.7 06/25/2024    BAPERCENT 0.2 06/25/2024    NE 5.28 06/25/2024    LYMABS 2.77 06/25/2024    MOABSO 0.68 06/25/2024    EOABSO 0.24 06/25/2024    BAABSO 0.02 06/25/2024       Lab Results   Component Value Date     06/25/2024    K 4.1 06/25/2024     06/25/2024    CO2 22.0 06/25/2024    ANIONGAP 8 06/25/2024    BUN 12 06/25/2024    CREATSERUM 0.86 06/25/2024    BUNCREA 14.0 06/25/2024    GLU 98 06/25/2024    CA 9.3 06/25/2024    OSMOCALC 284 06/25/2024    ALT 40 06/25/2024    AST 26 06/25/2024    ALKPHO 93 06/25/2024    BILT 0.6 06/25/2024    TP 7.2 06/25/2024    ALB 4.3 06/25/2024    GLOBULIN 2.9 06/25/2024    ELECTAG 1.5 06/25/2024    FASTING Yes 06/25/2024    FASTING Yes 06/25/2024         Lab Results   Component Value Date    CHOLEST 184 06/25/2024    TRIG 114 06/25/2024     HDL 41 2024     (H) 2024    VLDL 20 2024    NONHDLC 143 (H) 2024    CALCNONHDL 154 (H) 2016        DIAGNOSTICS      ASSESSMENT/PLAN  Problem List Items Addressed This Visit          Cardiac and Vasculature    RESOLVED: Chest pain     Resolved            Endocrine and Metabolic    Class 2 obesity due to excess calories without serious comorbidity with body mass index (BMI) of 37.0 to 37.9 in adult     Continue healthy diet and exercise.  Following with weight specialist.  On phentermine            Musculoskeletal and Injuries    Chronic right shoulder pain     Patient with chronic right shoulder pain.  Seems consistent with chronic right rotator cuff injury versus shoulder impingement.  May also have mild right biceps tendinitis.  Stretches and exercises provided in office  Avoid strenuous activity  Given chronicity advise going to physical therapy at this time.  Can take Tylenol or Advil as needed for pain.  Follow-up as needed         Relevant Orders    Physical Therapy Referral - [facility]    Pain of right forearm - Primary     Patient with discomfort in the lateral aspect of his forearm  Suspect mild muscle strain  He reports that pain is overall tolerable at this time.  Not desiring pain medication.  Advise avoiding strenuous exercise  Will refer to physical therapy.  Follow-up as needed.         Relevant Orders    Physical Therapy Referral - [facility]       Skin    RESOLVED: Rash     Resolved            Return in about 3 months (around 2025) for physical.    This is a Header Test   Topic Date Due    Annual Physical  2025        Len Borrego MD  Family Medicine           Pre-chartin minutes  Reviewing/obtainin minutes  Medical Exam: 3 minutes  Counseling/education: 5 minutes  Notes: 2 minutes  Referring/communicatin minutes  Care coordination: 0 minutes    My total time spent caring for the patient on the day of the encounter: 17  minutes         [1] No Known Allergies

## 2025-03-25 ENCOUNTER — OFFICE VISIT (OUTPATIENT)
Dept: INTERNAL MEDICINE CLINIC | Facility: CLINIC | Age: 40
End: 2025-03-25
Payer: COMMERCIAL

## 2025-03-25 VITALS
DIASTOLIC BLOOD PRESSURE: 86 MMHG | BODY MASS INDEX: 37.98 KG/M2 | HEIGHT: 67 IN | OXYGEN SATURATION: 99 % | TEMPERATURE: 98 F | SYSTOLIC BLOOD PRESSURE: 124 MMHG | WEIGHT: 242 LBS | HEART RATE: 80 BPM

## 2025-03-25 DIAGNOSIS — E66.09 CLASS 2 OBESITY DUE TO EXCESS CALORIES WITHOUT SERIOUS COMORBIDITY WITH BODY MASS INDEX (BMI) OF 37.0 TO 37.9 IN ADULT: ICD-10-CM

## 2025-03-25 DIAGNOSIS — G89.29 CHRONIC RIGHT SHOULDER PAIN: ICD-10-CM

## 2025-03-25 DIAGNOSIS — R21 RASH: ICD-10-CM

## 2025-03-25 DIAGNOSIS — E66.812 CLASS 2 OBESITY DUE TO EXCESS CALORIES WITHOUT SERIOUS COMORBIDITY WITH BODY MASS INDEX (BMI) OF 37.0 TO 37.9 IN ADULT: ICD-10-CM

## 2025-03-25 DIAGNOSIS — M79.631 PAIN OF RIGHT FOREARM: Primary | ICD-10-CM

## 2025-03-25 DIAGNOSIS — M25.511 CHRONIC RIGHT SHOULDER PAIN: ICD-10-CM

## 2025-03-25 DIAGNOSIS — R07.9 CHEST PAIN, UNSPECIFIED TYPE: ICD-10-CM

## 2025-03-25 PROBLEM — E66.9 OBESITY (BMI 30-39.9): Status: RESOLVED | Noted: 2025-02-25 | Resolved: 2025-03-25

## 2025-03-25 PROCEDURE — 3074F SYST BP LT 130 MM HG: CPT | Performed by: FAMILY MEDICINE

## 2025-03-25 PROCEDURE — 3008F BODY MASS INDEX DOCD: CPT | Performed by: FAMILY MEDICINE

## 2025-03-25 PROCEDURE — 99213 OFFICE O/P EST LOW 20 MIN: CPT | Performed by: FAMILY MEDICINE

## 2025-03-25 PROCEDURE — G2211 COMPLEX E/M VISIT ADD ON: HCPCS | Performed by: FAMILY MEDICINE

## 2025-03-25 PROCEDURE — 3079F DIAST BP 80-89 MM HG: CPT | Performed by: FAMILY MEDICINE

## 2025-03-25 NOTE — ASSESSMENT & PLAN NOTE
Patient with chronic right shoulder pain.  Seems consistent with chronic right rotator cuff injury versus shoulder impingement.  May also have mild right biceps tendinitis.  Stretches and exercises provided in office  Avoid strenuous activity  Given chronicity advise going to physical therapy at this time.  Can take Tylenol or Advil as needed for pain.  Follow-up as needed

## 2025-03-25 NOTE — PATIENT INSTRUCTIONS
PATIENT INSTRUCTIONS    Thank you for seeing me today, it was a pleasure taking care of you.  Please check out at the  and schedule a follow up appointment.  Return in about 3 months (around 6/25/2025) for physical.  Please remember that the preferred candi period for appointments is 5 minutes. This is to help maximize the amount of time that we can spend together at our visits.    Avoid strenuous activity with shoulder and arm if possible   Go to physical therapy  If in pain can take tylenol or advil as needed  Stretches and exercises to start    Best,  Dr. Borrego

## 2025-03-25 NOTE — ASSESSMENT & PLAN NOTE
Patient with discomfort in the lateral aspect of his forearm  Suspect mild muscle strain  He reports that pain is overall tolerable at this time.  Not desiring pain medication.  Advise avoiding strenuous exercise  Will refer to physical therapy.  Follow-up as needed.

## 2025-03-27 DIAGNOSIS — E66.9 OBESITY (BMI 30-39.9): ICD-10-CM

## 2025-03-27 RX ORDER — PHENTERMINE HYDROCHLORIDE 15 MG/1
15 CAPSULE ORAL EVERY MORNING
Qty: 30 CAPSULE | Refills: 5 | OUTPATIENT
Start: 2025-03-27

## 2025-05-13 ENCOUNTER — OFFICE VISIT (OUTPATIENT)
Dept: SURGERY | Facility: CLINIC | Age: 40
End: 2025-05-13
Payer: COMMERCIAL

## 2025-05-13 VITALS
BODY MASS INDEX: 37.04 KG/M2 | WEIGHT: 236 LBS | DIASTOLIC BLOOD PRESSURE: 80 MMHG | OXYGEN SATURATION: 97 % | HEART RATE: 90 BPM | HEIGHT: 67 IN | SYSTOLIC BLOOD PRESSURE: 118 MMHG | RESPIRATION RATE: 16 BRPM

## 2025-05-13 DIAGNOSIS — E66.9 OBESITY (BMI 30-39.9): ICD-10-CM

## 2025-05-13 DIAGNOSIS — E78.5 DYSLIPIDEMIA: Primary | ICD-10-CM

## 2025-05-13 DIAGNOSIS — R06.83 SNORING: ICD-10-CM

## 2025-05-13 DIAGNOSIS — Z51.81 ENCOUNTER FOR THERAPEUTIC DRUG MONITORING: ICD-10-CM

## 2025-05-13 PROCEDURE — 99214 OFFICE O/P EST MOD 30 MIN: CPT | Performed by: INTERNAL MEDICINE

## 2025-05-13 PROCEDURE — 3008F BODY MASS INDEX DOCD: CPT | Performed by: INTERNAL MEDICINE

## 2025-05-13 PROCEDURE — 3074F SYST BP LT 130 MM HG: CPT | Performed by: INTERNAL MEDICINE

## 2025-05-13 PROCEDURE — 3079F DIAST BP 80-89 MM HG: CPT | Performed by: INTERNAL MEDICINE

## 2025-05-13 NOTE — PROGRESS NOTES
[facility]  [address] [facility] [address] [place] [id number]  Dept: [phone]       Patient:  Patricio Buck Jr.  :      1985  MRN:      [id number]    Chief Complaint:    Chief Complaint   Patient presents with    Follow - Up    Weight Management       SUBJECTIVE     History of Present Illness:  Patricio is being seen today for a follow-up for non surgical weight loss    Past Medical History: Past Medical History[1]     Comorbidities:  Back pain-Improvement?  yes, Joint pain-Improvement?  yes, TEERSA-Improvement?  yes, and Snoring-Improvement?  yes    OBJECTIVE     Vitals: /80   Pulse 90   Resp 16   Ht 5' 7\" (1.702 m)   Wt 236 lb (107 kg)   SpO2 97%   BMI 36.96 kg/m²     Initial weight loss: -12   Total weight loss: -12    Start weight: 248    Wt Readings from Last 3 Encounters:   25 236 lb (107 kg)   25 242 lb (109.8 kg)   25 248 lb (112.5 kg)       Patient Medications:  Current Medications[2]  Allergies:  Patient has no known allergies.     Social History:    Social History     Socioeconomic History    Marital status: Single     Spouse name: Not on file    Number of children: Not on file    Years of education: Not on file    Highest education level: Not on file   Occupational History    Not on file   Tobacco Use    Smoking status: Never    Smokeless tobacco: Never   Vaping Use    Vaping status: Never Used   Substance and Sexual Activity    Alcohol use: Yes     Comment: Occasional - once a week    Drug use: No    Sexual activity: Yes     Partners: Female   Other Topics Concern    Not on file   Social History Narrative    Relationships:  - Yesenia     Children: Jeni (3F). Michelle - Liam (19M), Mago (18F), Jadashawnrick (15F)    Pets: None    School: N/A    Work: [employer] - transferred to the [employer]     Origin: From [facility]. Father was born here. Mother from [place].     Interests: Spending time with  family. Spending time working.     Spiritual: Muslim - prays in the morning, goes to [employer].      Social Drivers of Health     Food Insecurity: Not on file   Transportation Needs: Not on file   Stress: Not on file   Housing Stability: Not on file     Surgical History:  Past Surgical History[3]  Family History:  Family History[4]    Food Journal  Reviewed and Discussed:       Patient has a Food Journal?: yes   Patient is reading nutrition labels?  yes  Average Caloric Intake:     Average CHO Intake: 120  Is patient exercising? yes  Type of exercise? Home exercises     Eating Habits  Patient states the following:  Eats 2 meal(s) per day  Length of time it takes to consume a meal:  20  # of snacks per day: 1 Type of snacks:    Amount of soda consumption per day:  diet  Amount of water (in ounces) per day:  36  Drinking between meals only:  yes  Toughest challenge:  water    Nutritional Goals  Limit carbohydrates to 100 gms per day, Eat 100-200 calories within 1 hour of waking , and Eat 3-4 cups of fresh fruits or vegetables daily    Behavior Modifications Reviewed and Discussed  Eat breakfast, Eat 3 meals per day, Plan meals in advance, Read nutrition labels, Drink 64 oz of water per day, Maintain a daily food journal, No drinking 30 minutes before or after meals, Utlize portion control strategies to reduce calorie intake, Identify triggers for eating and manage cues, and Eat slowly and take 20 to 30 minutes to complete each meal    Exercise Goals Reviewed and Discussed    Increase as tolerated    ROS:    Constitutional: negative  Respiratory: negative  Cardiovascular: negative  Gastrointestinal: negative  Musculoskeletal:negative  Neurological: negative  Behavioral/Psych: negative  Endocrine: negative  All other systems were reviewed and are negative    Physical Exam:   General appearance: alert, appears stated age, cooperative, and mildly obese  Head: Normocephalic, without obvious abnormality, atraumatic  Neck: no  adenopathy, no carotid bruit, no JVD, supple, symmetrical, trachea midline, and thyroid not enlarged, symmetric, no tenderness/mass/nodules  Back: symmetric, no curvature. ROM normal. No CVA tenderness.  Lungs: clear to auscultation bilaterally  Heart: S1, S2 normal, no murmur, click, rub or gallop, regular rate and rhythm  Abdomen: soft, non-tender; bowel sounds normal; no masses,  no organomegaly  Extremities: extremities normal, atraumatic, no cyanosis or edema  Skin: Skin color, texture, turgor normal. No rashes or lesions  Neurologic: Grossly normal    ASSESSMENT     HYPERCHOLESTEROLEMIA:  The patient states that his cholesterol has been well controlled on his current diet    Lab Results   Component Value Date/Time    CHOLEST 184 06/25/2024 10:38 AM     (H) 06/25/2024 10:38 AM    HDL 41 06/25/2024 10:38 AM    TRIG 114 06/25/2024 10:38 AM    VLDL 20 06/25/2024 10:38 AM       Encounter Diagnosis(ses):   Encounter Diagnoses   Name Primary?    Dyslipidemia Yes    Snoring     Encounter for therapeutic drug monitoring     Obesity (BMI 30-39.9)        PLAN     Patient is not interested in bariatric surgery. Patient desires to pursue traditional weight loss at this time.      DYSLIPIDEMIA: Stable on the above prescribed meal plan and medication. Liver function stable.    Lab Results   Component Value Date/Time    CHOLEST 184 06/25/2024 10:38 AM     (H) 06/25/2024 10:38 AM    HDL 41 06/25/2024 10:38 AM    TRIG 114 06/25/2024 10:38 AM    VLDL 20 06/25/2024 10:38 AM       OBSTRUCTIVE SLEEP APNEA: Given the patient's history suggestive of obstructive sleep apnea as outlined above, consideration for obtaining a sleep study may be warranted.  Further consideration for obtaining the sleep study will be discussed with the patient's PCP.    Goals for next month:  1. Keep a food log.  2. Drink 48-64 ounces of non-caloric beverages per day. No fruit juices or regular soda.  3. Increase activity-upper body exercises,  walk 10 minutes per day.  4. Increase fruit and vegetable servings to 5-6 per day.        Snoring: improved    Phentermine: tolerating well  Refill at current dose  EKG done    Feels better          Diagnoses and all orders for this visit:    Dyslipidemia    Snoring    Encounter for therapeutic drug monitoring    Obesity (BMI 30-39.9)          Santos Chamberlain MD       [1]   Past Medical History:   Obesity (BMI 30-39.9)   [2]   Current Outpatient Medications   Medication Sig Dispense Refill    Phentermine HCl 15 MG Oral Cap Take 1 capsule (15 mg total) by mouth every morning. 30 capsule 5   [3] No past surgical history on file.  [4]   Family History  Problem Relation Age of Onset    Hyperlipidemia Mother     Hypertension Mother     No Known Problems Father         Not in contact    Hypertension Sister     Breast Cancer Maternal Grandmother     Cancer Maternal Grandfather         Brain    No Known Problems Paternal Grandmother     No Known Problems Paternal Grandfather     Colon Cancer Neg     Prostate Cancer Neg

## 2025-06-12 ENCOUNTER — PATIENT MESSAGE (OUTPATIENT)
Dept: INTERNAL MEDICINE CLINIC | Facility: CLINIC | Age: 40
End: 2025-06-12

## 2025-06-13 ENCOUNTER — NURSE TRIAGE (OUTPATIENT)
Dept: INTERNAL MEDICINE CLINIC | Facility: CLINIC | Age: 40
End: 2025-06-13

## 2025-06-13 NOTE — TELEPHONE ENCOUNTER
Future Appointments   Date Time Provider Department Center   6/18/2025  3:00 PM Len Borrego MD [facility] [address]   11/17/2025  9:00 AM [id number]    Do you want him to do anything while he waits to see you.   Reason for Disposition   After 5 days and shape of the nose has not returned to normal    Answer Assessment - Initial Assessment Questions  1. MECHANISM: \"How did the injury happen?\"       Metal bar from display hit his nose   2. ONSET: \"When did the injury happen?\" (Minutes or hours ago)       6/7/25  3. LOCATION: \"What part of the nose is injured?\"       Bridge  4. APPEARANCE of INJURY: \"What does the nose look like?\"       Swelling   5. BLEEDING: \"Is the nose still bleeding?\" If Yes, ask: \"Is it difficult to stop?\"       Yes but stopped in 10 minutes   6. SIZE: For cuts, bruises, or swelling, ask: \"How large is it?\" (e.g., inches or centimeters;  entire nose)       Dime size cut with swelling   7. PAIN: \"Is it painful?\" If Yes, ask: \"How bad is the pain?\"   (Scale 1-10; or mild, moderate, severe)      No   8. TETANUS: For any breaks in the skin, ask: \"When was the last tetanus booster?\"     Last   year he thinks but he is not sure   9. OTHER SYMPTOMS: \"Do you have any other symptoms?\" (e.g., headache, neck pain, loss of consciousness)      Headache at the beginning  10. PREGNANCY: \"Is there any chance you are pregnant?\" \"When was your last menstrual period?\"        NA    Protocols used: Nose Injury-A-[place]

## 2025-06-14 NOTE — TELEPHONE ENCOUNTER
Noted. Thanks can follow up with me in office and I can take a look. Otherwise if very uncomfortable go to immediate care or ER. ThanksLen